# Patient Record
Sex: MALE | Race: WHITE | Employment: FULL TIME | URBAN - METROPOLITAN AREA
[De-identification: names, ages, dates, MRNs, and addresses within clinical notes are randomized per-mention and may not be internally consistent; named-entity substitution may affect disease eponyms.]

---

## 2021-03-27 ENCOUNTER — HOSPITAL ENCOUNTER (EMERGENCY)
Age: 31
Discharge: HOME | End: 2021-03-27

## 2021-03-27 VITALS
TEMPERATURE: 98.9 F | WEIGHT: 210 LBS | HEART RATE: 81 BPM | DIASTOLIC BLOOD PRESSURE: 98 MMHG | BODY MASS INDEX: 33.75 KG/M2 | SYSTOLIC BLOOD PRESSURE: 143 MMHG | HEIGHT: 66 IN | OXYGEN SATURATION: 98 % | RESPIRATION RATE: 18 BRPM

## 2021-03-27 DIAGNOSIS — K04.7 DENTAL ABSCESS: ICD-10-CM

## 2021-03-27 DIAGNOSIS — K08.89 PAIN, DENTAL: Primary | ICD-10-CM

## 2021-03-27 DIAGNOSIS — K02.9 DENTAL CARIES: ICD-10-CM

## 2021-03-27 PROCEDURE — 99211 OFF/OP EST MAY X REQ PHY/QHP: CPT

## 2021-03-27 RX ORDER — NAPROXEN 500 MG/1
500 TABLET ORAL 2 TIMES DAILY
Qty: 14 TABLET | Refills: 0 | Status: SHIPPED | OUTPATIENT
Start: 2021-03-27 | End: 2021-04-03

## 2021-03-27 RX ORDER — PENICILLIN V POTASSIUM 500 MG/1
500 TABLET ORAL 4 TIMES DAILY
Qty: 40 TABLET | Refills: 0 | Status: SHIPPED | OUTPATIENT
Start: 2021-03-27 | End: 2021-04-06

## 2021-03-27 ASSESSMENT — PAIN SCALES - GENERAL: PAINLEVEL_OUTOF10: 6

## 2021-03-27 ASSESSMENT — PAIN DESCRIPTION - ORIENTATION: ORIENTATION: RIGHT;LOWER

## 2021-03-27 ASSESSMENT — PAIN DESCRIPTION - PAIN TYPE: TYPE: ACUTE PAIN

## 2021-03-27 ASSESSMENT — PAIN DESCRIPTION - ONSET: ONSET: GRADUAL

## 2021-03-27 NOTE — ED PROVIDER NOTES
Department of Emergency Medicine   [facility]  Provider Note  Admit Date/RoomTime: 3/27/2021  4:15 PM  Room:       NAME: Patricio Grossman  : 1990  MRN: [id number]     Chief Complaint:  Dental Pain (Having right lower dental pain.)    History of Present Illness        Patricio Grossman is a 27 y.o. old male who presents to the emergency department by private vehicle, for non-traumatic right lower wisdom tooth pain, which occured a few day(s) prior to arrival.  Since onset the symptoms have been persistent and moderate in severity. Worsened by  heat, cold and chewing and improved by nothing. Associated Signs & Symptoms: Lower facial pain and swelling. He has a history of dental abscess secondary to impacted wisdom teeth which he never had removed because of the cost and his dentist no longer excepting his insurance. .           Onset:       Spontaneous:   yes. Following Trauma:   no.     Previous Caries:   yes. Recent Dental Procedure:   no.     ROS   Pertinent positives and negatives are stated within HPI, all other systems reviewed and are negative. Past Medical History:  has no past medical history on file. Surgical History:  has no past surgical history on file. Social History:  reports that he has been smoking cigarettes. He has been smoking about 0.50 packs per day. He has never used smokeless tobacco.    Family History: family history is not on file. Allergies: Patient has no known allergies. Physical Exam           ED Triage Vitals [21 1617]   BP Temp Temp Source Pulse Resp SpO2 Height Weight   (!) 153/101 98.9 °F (37.2 °C) Infrared 86 18 98 % 5' 6\" (1.676 m) 210 lb (95.3 kg)      Oxygen Saturation Interpretation: Normal.    Constitutional:  Alert, development consistent with age. HEENT:  NC/NT. Airway patent. Neck:  Supple. Normal ROM. Lips:  upper and lower normal.  Mouth:  normal tongue and buccal mucosa.   Dental:  #32 transcription errors may be present.   END OF ED PROVIDER NOTE       Deadra Krabbe, [place]  03/27/21 1000

## 2025-02-20 ENCOUNTER — HOSPITAL ENCOUNTER (EMERGENCY)
Age: 35
Discharge: HOME | End: 2025-02-20

## 2025-02-20 VITALS
BODY MASS INDEX: 34.7 KG/M2 | HEART RATE: 73 BPM | RESPIRATION RATE: 18 BRPM | TEMPERATURE: 98.7 F | WEIGHT: 215 LBS | DIASTOLIC BLOOD PRESSURE: 92 MMHG | OXYGEN SATURATION: 99 % | SYSTOLIC BLOOD PRESSURE: 162 MMHG

## 2025-02-20 DIAGNOSIS — L08.9 WOUND INFECTION: Primary | ICD-10-CM

## 2025-02-20 DIAGNOSIS — T14.8XXA WOUND INFECTION: Primary | ICD-10-CM
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
## 2025-02-20 DIAGNOSIS — L08.9 INFECTION OF CAT SCRATCH WOUND: ICD-10-CM

## 2025-02-20 PROCEDURE — 99211 OFF/OP EST MAY X REQ PHY/QHP: CPT

## 2025-02-20 ASSESSMENT — PAIN - FUNCTIONAL ASSESSMENT: PAIN_FUNCTIONAL_ASSESSMENT: 0-10

## 2025-02-20 ASSESSMENT — PAIN SCALES - GENERAL: PAINLEVEL_OUTOF10: 1

## 2025-02-20 ASSESSMENT — PAIN DESCRIPTION - FREQUENCY: FREQUENCY: CONTINUOUS

## 2025-02-20 ASSESSMENT — PAIN DESCRIPTION - LOCATION: LOCATION: FACE

## 2025-02-21 NOTE — ED PROVIDER NOTES
Independent JULIETTE Visit.        [facility]  ED  Encounter Note  Admit Date/RoomTime: 2025  7:08 PM  ED Room:   NAME: Jorge Murphy  : 1990  MRN: [id number]  PCP: No primary care provider on file.    CHIEF COMPLAINT     Wound Check (Onset 6 days ago corner left eye now on left check, swelling noted. Thought cat scratched at 1st)    HISTORY OF PRESENT ILLNESS        Jorge Murphy is a 34 y.o. male who presents to the ED pt states he thinks he got scratched by his cat 6 days ago. Now has swelling to his left upper check and outer aspect of eye. Pt denies fevers, chills, and reports no other associated symptoms. Pt does feels some warmth to the area from time to time.    REVIEW OF SYSTEMS     Pertinent positives and negatives are stated within HPI, all other systems reviewed and are negative.    Past Medical History:  has no past medical history on file.  Surgical History:  has no past surgical history on file.  Social History:  reports that he has been smoking cigarettes. He has never used smokeless tobacco.  Family History: family history is not on file.   Allergies: Patient has no known allergies.  CURRENT MEDICATIONS       Discharge Medication List as of 2025  7:53 PM        CONTINUE these medications which have NOT CHANGED    Details   naproxen (NAPROSYN) 500 MG tablet Take 1 tablet by mouth 2 times daily for 7 days, Disp-14 tablet, R-0Normal             SCREENINGS               CIWA Assessment  BP: (!) 162/92  Pulse: 73       PHYSICAL EXAM   Oxygen Saturation Interpretation: Normal on room air analysis.        ED Triage Vitals [25 1912]   BP Systolic BP Percentile Diastolic BP Percentile Temp Temp Source Pulse Respirations SpO2   (!) 162/92 -- -- 98.7 °F (37.1 °C) Temporal 73 18 99 %      Height Weight - Scale         -- 97.5 kg (215 lb)             Constitutional/General: Alert and oriented x3, well appearing, non toxic.  HEENT:  Airway patent. Eyes without

## 2025-02-21 NOTE — DISCHARGE INSTRUCTIONS
Take all antibiotics as ordered   If symptoms change you must be seen in ED for further evaluation